# Patient Record
Sex: MALE | Race: WHITE
[De-identification: names, ages, dates, MRNs, and addresses within clinical notes are randomized per-mention and may not be internally consistent; named-entity substitution may affect disease eponyms.]

---
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
## 2017-11-27 NOTE — XR
EXAMINATION TYPE: XR hand complete LT

 

DATE OF EXAM: 11/27/2017

 

COMPARISON: NONE

 

HISTORY: 62-year-old male laceration on palm, pain.

 

TECHNIQUE: 3 views

 

FINDINGS: 

No acute fracture, subluxation, or dislocation. There is a punctate density seen projecting over the 
mid aspect of the thumb that could represent external artifact or some retained foreign body material
 in the soft tissues. There is some degenerative change at the distal radial ulnar joint. No acute fr
acture, subluxation, or dislocation.

 

 

IMPRESSION: 

No acute osseous abnormality seen. Punctate density projecting at the mid aspect of the thumb could r
epresent external artifact or some retained foreign body material in the soft tissues.

## 2021-06-28 ENCOUNTER — HOSPITAL ENCOUNTER (OUTPATIENT)
Dept: HOSPITAL 47 - RADXRMAIN | Age: 66
Discharge: HOME | End: 2021-06-28
Attending: INTERNAL MEDICINE
Payer: MEDICARE

## 2021-06-28 DIAGNOSIS — M12.812: Primary | ICD-10-CM

## 2021-06-29 NOTE — XR
EXAMINATION TYPE: XR shoulder limited LT

 

DATE OF EXAM: 6/28/2021

 

COMPARISON: NONE

 

HISTORY: Pain

 

TECHNIQUE: Three views are submitted.

 

FINDINGS:

The osseous structures are intact.  There is no acute fracture or dislocation. Well-corticated densit
y adjacent to the AC joint with mild hypertrophic changes. Suspect small granuloma left upper lobe. A
dditional peripheral nodule left midlung noted.

 

IMPRESSION:

1. Mild AC joint arthropathy.

2. Chest x-ray recommended to assess for pulmonary nodules.

## 2021-07-02 ENCOUNTER — HOSPITAL ENCOUNTER (OUTPATIENT)
Dept: HOSPITAL 47 - RADCTMAIN | Age: 66
Discharge: HOME | End: 2021-07-02
Attending: INTERNAL MEDICINE
Payer: MEDICARE

## 2021-07-02 DIAGNOSIS — R91.1: Primary | ICD-10-CM

## 2021-07-02 PROCEDURE — 71250 CT THORAX DX C-: CPT

## 2021-07-03 NOTE — CT
EXAMINATION TYPE: CT chest wo con

 

DATE OF EXAM: 7/2/2021

 

COMPARISON: No prior

 

HISTORY: pulmonary nodule

 

CT DLP: 465.30 mGycm.  Automated Exposure Control for Dose Reduction was Utilized.

 

TECHNIQUE:  CT scan of the thorax is performed without IV contrast.

 

FINDINGS:

 

LUNGS: 

5 mm nodule adjacent to the right major fissure seen on the lung reformats image 27.

 

There is a 6 mm nodule in the left lower lobe inferior lateral aspect in on the lung reformats image 
46.

 

Bibasilar atelectatic changes demonstrated.

 

The lungs are grossly clear, there is no concerning parenchymal mass.   

 

No pleural effusion or pneumothorax.

 

The tracheobronchial tree is patent.

 

MEDIASTINUM: 

Lack of IV contrast limits evaluation for mediastinal and especially hilar adenopathy.

 

There are no definitive greater than 1 cm hilar or mediastinal lymph nodes.   

 

No cardiomegaly or pericardial effusion is seen. 

 

Intrathoracic aorta and main pulmonary trunk grossly unremarkable.

 

OTHER:  

Thyroid gland is not enlarged.

 

There is a hypoattenuating lesion in the left hepatic lobe with mean Hounsfield unit 6.7 measures 2 x
 1.5 x 1.7 cm likely representing cysts.

 

There is apparent fullness to the bilateral renal collecting systems.

 

No acute osseous abnormality. Mild degenerative changes seen in the thoracic spine.

 

IMPRESSION: 

1. Right fissure-based nodule measuring 5 mm.

2. Left lower lobe solid 6 mm nodule.

3. Hypoattenuating lesion, left hepatic lobe, likely cyst, measures up to 2 cm, attention on follow-u
p.

4. Apparent fullness to the bilateral renal collecting system, correlate with renal ultrasound to exc
lude hydronephrosis.

 

RECOMMENDATION:

1. Noncontrast CT chest recommended in 6-12 months. Please refer to Fleischner criteria for pulmonary
 nodule follow-up.

2. Renal ultrasound.

## 2021-08-13 ENCOUNTER — HOSPITAL ENCOUNTER (OUTPATIENT)
Dept: HOSPITAL 47 - RADCTMAIN | Age: 66
Discharge: HOME | End: 2021-08-13
Attending: UROLOGY
Payer: MEDICARE

## 2021-08-13 DIAGNOSIS — R16.0: ICD-10-CM

## 2021-08-13 DIAGNOSIS — N40.0: Primary | ICD-10-CM

## 2021-08-13 DIAGNOSIS — N13.30: ICD-10-CM

## 2021-08-13 LAB — BUN SERPL-SCNC: 25 MG/DL (ref 9–20)

## 2021-08-13 PROCEDURE — 82565 ASSAY OF CREATININE: CPT

## 2021-08-13 PROCEDURE — 36415 COLL VENOUS BLD VENIPUNCTURE: CPT

## 2021-08-13 PROCEDURE — 74178 CT ABD&PLV WO CNTR FLWD CNTR: CPT

## 2021-08-13 PROCEDURE — 74400 UROGRAPHY IV +-KUB TOMOG: CPT

## 2021-08-13 PROCEDURE — 84520 ASSAY OF UREA NITROGEN: CPT

## 2021-08-14 NOTE — CT
EXAMINATION TYPE: CT urogram wo/w con

 

DATE OF EXAM: 8/13/2021

 

COMPARISON: Correlation CT 7/2/2021

 

HISTORY: 66-year-old male unspecified hydronephrosis

 

TECHNIQUE: Contiguous axial scanning of the abdomen and pelvis performed without and with IV Contrast
, patient injected with 100 mL of Isovue 300. Delayed images through the kidneys and bladder were obt
ained. Coronal/sagittal reconstructions performed. Reconstructions generated on a dedicated [employer] workstation.

 

CT DLP: 2953.4 mGycm

Automated exposure control for dose reduction was used.

 

FINDINGS: 

Heart is normal size without pericardial effusion. Lung bases clear without pleural effusion.

 

There is a small hiatal hernia.

 

Mild hepatomegaly at 18.5 cm. Low-attenuation parenchyma compatible with fatty infiltration. There is
 a cyst in the left hepatic dome measuring 2.0 cm. Portal venous system is patent. No biliary ductal 
dilatation.

 

Gallbladder, adrenal glands, spleen, and pancreas within normal limits.

 

Kidneys show no evidence for nephrolithiasis. On delayed kidney images, there is evidence of bilatera
l parapelvic cysts measuring up to 3.0 cm rather than hydronephrosis.

 

No suspicious renal mass is seen. No suspicious filling defects within the renal collecting systems.

 

Only a short segment of the distal left ureter remains nonopacified. However, there is no abnormal th
ickening here. The remaining bilateral ureters show no gross abnormal body.

 

Small fatty umbilical hernia measuring 1.7 cm wide.

 

No dilated small bowel, free fluid, or free air. No mesenteric or retroperitoneal lymphadenopathy.

 

Mild to moderate stool burden. Proximal to mid sigmoid diverticulosis. No pericolonic inflammatory ch
michelle.

 

Bladder partially distended.. Prominent prostatomegaly is 7.1 cm wide with lobular soft tissue impres
sing upon to the bladder base, series 9 and axial image 79. Patulous left inguinal canal. Borderline 
sized 9 mm left external iliac chain lymph node. Pelvic phlebolith. No abnormal fluid collection in t
he pelvis.

 

Bones: Mild marginal spurring at the hips. Moderate degenerative disc disease L4-L5. Facet arthropath
y mid to lower lumbar spine. Trace grade 1 retrolisthesis L2-L3.

 

 

IMPRESSION: 

 

1. DELAYED POSTCONTRAST KIDNEY IMAGES CLARIFIES THAT THE BILATERAL RENAL FINDINGS RELATE TO BENIGN PA
RAPELVIC CYSTS MEASURING UP TO 3.0 CM RATHER THAN HYDRONEPHROSIS. NO SUSPICIOUS RENAL LESION OR CALCU
ALETHA.

2. ONLY THE DISTAL THIRD LEFT URETER REMAINS NONOPACIFIED LIMITING ASSESSMENT. HOWEVER, THERE IS NO A
BNORMAL SOFT TISSUE THICKENING HERE. THE REMAINDER OF THE RENAL COLLECTING SYSTEMS AND URETERS APPEAR
 NORMAL.

3. PRONOUNCED PROSTATOMEGALY AT 7.1 CM WIDE WITH LOBULAR SOFT TISSUE IMPRESSING INTO THE BLADDER BASE
. SUSPECT BPH. CORRELATE WITH PATIENT'S SYMPTOMS, PHYSICAL EXAM, AND PSA.

4. INCIDENTAL: MILD HEPATOMEGALY WITH FATTY INFILTRATION. SMALL HIATAL HERNIA, TINY FATTY UMBILICAL H
ERNIA, AND PROXIMAL TO MID SIGMOID DIVERTICULOSIS.

## 2022-07-01 ENCOUNTER — HOSPITAL ENCOUNTER (OUTPATIENT)
Dept: HOSPITAL 47 - RADCTMAIN | Age: 67
Discharge: HOME | End: 2022-07-01
Attending: INTERNAL MEDICINE
Payer: MEDICARE

## 2022-07-01 DIAGNOSIS — K44.9: Primary | ICD-10-CM

## 2022-07-01 DIAGNOSIS — R91.1: ICD-10-CM

## 2022-07-01 PROCEDURE — 71250 CT THORAX DX C-: CPT

## 2022-07-01 NOTE — CT
EXAMINATION TYPE: CT chest wo con

 

DATE OF EXAM: 7/1/2022

 

COMPARISON: 7/2/2021

 

HISTORY: 67-year-old male R91.1, Pulmonary Nodule

 

TECHNIQUE: Contiguous axial scanning of the chest without IV contrast. Coronal and sagittal reconstru
ctions performed.

 

CT DLP: 450.3 mGycm

Automated exposure control for dose reduction was used.

 

 

FINDINGS: 

Heart upper limits of normal in size. There is trace right basilar pericardial fluid.

 

Stable tiny 7 mm subcutaneous nodule anterior lower right chest, possible sebaceous cyst that can be 
correlated clinically with physical exam.

 

Aorta normal caliber with conventional arterial vessel branching anatomy.

 

No thoracic lymphadenopathy by CT size criteria.

 

Mild central bronchial wall thickening. This could reflect bronchitis or chronic asthma.

 

Tiny 3 mm subpleural nodule periphery of the right base is unchanged.

6 mm left basilar pulmonary nodule, axial image 47 is also unchanged.

Tiny 4 mm right mid lung pulmonary nodule along the fissure on axial image 29 unchanged.

 

Stable scarring at the inferior lingula.

 

2.0 cm hypodense lesion central left hepatic dome likely a cyst, unchanged from prior. There is a sma
ll hiatal hernia and bilateral parapelvic cysts redemonstrated as demonstrated on the 8/13/2021 CT ur
ogram. Mild hepatic hypodensity compatible with fatty infiltration.

 

Bones: Mild anterior endplate spondylosis lower thoracic spine.

 

 

IMPRESSION: 

 

1. STABLE. PULMONARY NODULES MEASURING UP TO 6 MM. CONSIDER AN ADDITIONAL ONE-YEAR FOLLOW-UP TO DOCUM
ENT 2 YEARS OF STABILITY.

2. SMALL HIATAL HERNIA AND BILATERAL PARAPELVIC CYSTS IN THE KIDNEYS. MILD FATTY INFILTRATION OF THE 
LIVER.

## 2024-08-26 ENCOUNTER — HOSPITAL ENCOUNTER (EMERGENCY)
Dept: HOSPITAL 47 - EC | Age: 69
Discharge: HOME | End: 2024-08-26
Payer: MEDICARE

## 2024-08-26 VITALS
HEART RATE: 58 BPM | DIASTOLIC BLOOD PRESSURE: 92 MMHG | RESPIRATION RATE: 18 BRPM | SYSTOLIC BLOOD PRESSURE: 152 MMHG | TEMPERATURE: 98.2 F

## 2024-08-26 DIAGNOSIS — T31.10: Primary | ICD-10-CM
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
## 2024-08-26 DIAGNOSIS — T20.20XA: ICD-10-CM

## 2024-08-26 DIAGNOSIS — Z23: ICD-10-CM

## 2024-08-26 DIAGNOSIS — Z91.048: ICD-10-CM

## 2024-08-26 PROCEDURE — 90715 TDAP VACCINE 7 YRS/> IM: CPT

## 2024-08-26 PROCEDURE — 90471 IMMUNIZATION ADMIN: CPT

## 2024-08-26 PROCEDURE — 99283 EMERGENCY DEPT VISIT LOW MDM: CPT

## 2024-08-26 PROCEDURE — 96372 THER/PROPH/DIAG INJ SC/IM: CPT

## 2024-08-26 RX ADMIN — MORPHINE SULFATE STA MG: 4 INJECTION, SOLUTION INTRAMUSCULAR; INTRAVENOUS at 08:19

## 2024-08-26 RX ADMIN — TETANUS TOXOID, REDUCED DIPHTHERIA TOXOID AND ACELLULAR PERTUSSIS VACCINE, ADSORBED ONE ML: 5; 2.5; 8; 8; 2.5 SUSPENSION INTRAMUSCULAR at 08:05

## 2024-08-26 RX ADMIN — BACITRACIN ONE EACH: 500 OINTMENT TOPICAL at 08:40
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
## 2024-09-26 ENCOUNTER — HOSPITAL ENCOUNTER (OUTPATIENT)
Dept: HOSPITAL 47 - RADUSWWP | Age: 69
Discharge: HOME | End: 2024-09-26
Attending: INTERNAL MEDICINE
Payer: MEDICARE

## 2024-09-26 DIAGNOSIS — R74.8: Primary | ICD-10-CM

## 2024-09-26 PROCEDURE — 76705 ECHO EXAM OF ABDOMEN: CPT

## 2024-09-30 NOTE — US
EXAMINATION TYPE: US liver

 

DATE OF EXAM: 9/26/2024

 

COMPARISON: NONE

 

CLINICAL INDICATION: Male, 69 years old with history of R74.8 ABNORMAL LEVELS OF OTHER SERUM ENZYMES;
 abnormal labs.  

 

TECHNIQUE: Multiple sonographic images of the right upper quadrant are obtained.

 

FINDINGS:

 

EXAM MEASUREMENTS:

 

Liver Length:  14.8 cm   

Gallbladder Wall:  0.1 cm   

Right Kidney:  11.2 x 5.8 x 6.2 cm

 

SONOGRAPHER NOTES:***limited due to overlying bowel

 

Pancreas:  Obscured by bowel gas

Liver:  No prominent masses or lesions seen at time of scan  

Gallbladder:  No wall thickening or stones

**Evidence for sonographic Trevino's sign:  neg

CBD:  Obscured by overlying bowel gas 

Right Kidney:  Anechoic area seen, possible dilated renal collecting system.  Dromedary hump.  

 

 

 

IMPRESSION: 

 

1. No acute ultrasound abnormality right upper quadrant ultrasound

 

[facility] Terrie Oden, Workstation: [email or web address], 9/30/2024 8:13 AM